# Patient Record
Sex: FEMALE | Race: WHITE
[De-identification: names, ages, dates, MRNs, and addresses within clinical notes are randomized per-mention and may not be internally consistent; named-entity substitution may affect disease eponyms.]

---

## 2017-08-29 ENCOUNTER — HOSPITAL ENCOUNTER (OUTPATIENT)
Dept: HOSPITAL 62 - WI | Age: 77
End: 2017-08-29
Attending: PHYSICIAN ASSISTANT
Payer: MEDICARE

## 2017-08-29 DIAGNOSIS — C50.911: Primary | ICD-10-CM

## 2017-08-29 PROCEDURE — G0204 DX MAMMO INCL CAD BI: HCPCS

## 2017-08-29 PROCEDURE — 77066 DX MAMMO INCL CAD BI: CPT

## 2017-08-30 NOTE — WOMENS IMAGING REPORT
EXAM DESCRIPTION:  BILAT DIAGNOSTIC MAMMO W/CAD



COMPLETED DATE/TIME:  8/29/2017 12:54 pm



REASON FOR STUDY:  BREAST CANCER C50.911  MALIGNANT NEOPLASM OF UNSP SITE OF RIGHT FEMALE SHE



COMPARISON:  Multiple since 1/15/2015



TECHNIQUE:  Standard craniocaudal and mediolateral oblique views of each breast recorded using digita
l acquisition and breast tomosynthesis.

Additional right breast 90 mediolateral tomosynthesis



LIMITATIONS:  None.



FINDINGS:  RIGHT BREAST

MASSES: No suspicious masses.

CALCIFICATIONS: No new or suspicious calcifications.

ARCHITECTURAL DISTORTION: Post therapeutic architectural distortion is present from lumpectomy.  This
 is adjacent to multiple surgical clips.  Overall density of the lumpectomy site is decreased over th
e series of exams

DEVELOPING DENSITY: None.

ASYMMETRY: None noted.

OTHER: No other significant findings.

LEFT BREAST

MASSES: No suspicious masses.

CALCIFICATIONS: No new or suspicious calcifications.

ARCHITECTURAL DISTORTION: None.

DEVELOPING DENSITY: None.

ASYMMETRY: None noted.

OTHER: No other significant finding.

Read with the assistance of CAD:

.[facility] - R2 C[employer]ova Version 1.3

.[facility] - R2 Cenova Version 1.3

.[facility] - R2 Cenova Version 2.4

.[facility] - R2 Cenova Version 2.4

.[facility] - R2  Version 9.2



IMPRESSION:  No mammographic/ tomosynthesis evidence for malignancy bilaterally



BREAST DENSITY:  c. The breasts are heterogeneously dense, which may obscure small masses.



BIRAD:  2 Benign findings.



RECOMMENDATION:  RECOMMENDED FOLLOW UP: No mammographic/ tomosynthesis evidence of malignancy bilater
ally.

SPECIFIC INTERVENTION/IMAGING/CONSULTATION RECOMMENDED:No additional intervention/ imaging/consultati
on needed at this time.

COMMUNICATION:Patient notified by letter



COMMENT:  The patient has been notified of the results by letter per SA requirements. Additional no
tification policies are in place for contacting patient with suspicious or incomplete findings.

Quality ID #225: The American College of Radiology recommends an annual screening mammogram for women
 aged 40 years or over. This facility utilizes a reminder system to ensure that all patients receive 
reminder letters, and/or direct phone calls for appointments. This includes reminders for routine scr
eening mammograms, diagnostic mammograms, or other Breast Imaging Interventions when appropriate.  Th
is patient will be placed in the appropriate reminder system.

The American College of Radiology (ACR) has developed recommendations for screening MRI of the breast
s in certain patient populations, to be used in conjunction with mammography.  Breast MRI surveillanc
e may be appropriate for women with more than 20% lifetime risk of developing breast cancer  as deter
mined by genetic testing, significant family history of the disease, or history of mantle radiation f
or Hodgkins Disease.  ACR Practice Guidelines 2008.

[employer]



RS 6045F:  Fluoroscopic imaging is not utilized for breast tomosynthesis.



TECHNICAL DOCUMENTATION:  FINDING NUMBER: (1)

ASSESSMENT: (1)

JOB ID:  [id number]

 2011 [employer]- All Rights Reserved

## 2018-02-21 ENCOUNTER — HOSPITAL ENCOUNTER (OUTPATIENT)
Dept: HOSPITAL 62 - END | Age: 78
Discharge: HOME | End: 2018-02-21
Attending: INTERNAL MEDICINE
Payer: MEDICARE

## 2018-02-21 VITALS — DIASTOLIC BLOOD PRESSURE: 64 MMHG | SYSTOLIC BLOOD PRESSURE: 125 MMHG

## 2018-02-21 DIAGNOSIS — I10: ICD-10-CM

## 2018-02-21 DIAGNOSIS — K31.9: ICD-10-CM

## 2018-02-21 DIAGNOSIS — Z79.899: ICD-10-CM

## 2018-02-21 DIAGNOSIS — K59.09: ICD-10-CM

## 2018-02-21 DIAGNOSIS — K22.719: Primary | ICD-10-CM

## 2018-02-21 DIAGNOSIS — E78.00: ICD-10-CM

## 2018-02-21 PROCEDURE — G0121 COLON CA SCRN NOT HI RSK IND: HCPCS

## 2018-02-21 PROCEDURE — 0DB68ZX EXCISION OF STOMACH, VIA NATURAL OR ARTIFICIAL OPENING ENDOSCOPIC, DIAGNOSTIC: ICD-10-PCS | Performed by: INTERNAL MEDICINE

## 2018-02-21 PROCEDURE — 43239 EGD BIOPSY SINGLE/MULTIPLE: CPT

## 2018-02-21 PROCEDURE — 0D558ZZ DESTRUCTION OF ESOPHAGUS, VIA NATURAL OR ARTIFICIAL OPENING ENDOSCOPIC: ICD-10-PCS | Performed by: INTERNAL MEDICINE

## 2018-02-21 PROCEDURE — 43270 EGD LESION ABLATION: CPT

## 2018-02-21 PROCEDURE — 0DJD8ZZ INSPECTION OF LOWER INTESTINAL TRACT, VIA NATURAL OR ARTIFICIAL OPENING ENDOSCOPIC: ICD-10-PCS | Performed by: INTERNAL MEDICINE

## 2018-02-21 PROCEDURE — 88305 TISSUE EXAM BY PATHOLOGIST: CPT

## 2018-02-21 PROCEDURE — 88342 IMHCHEM/IMCYTCHM 1ST ANTB: CPT

## 2018-02-21 NOTE — OPERATIVE REPORT
Operative Report


DATE OF SURGERY: 02/21/18


Operative Report: 





The risks, benefits and alternatives of the procedure, perforation requiring 

surgery are explained to the patient in detail and informed consent is 

obtained.  Patient is placed in the left, lateral decubital position.  Timeout 

was called.  Propofol medications administered.  A rectal examination is done 

which did not reveal any masses, tears or fissures.  An Olympus videoscope was 

inserted into the patient's rectum.  The scope was then carefully advanced all 

the way to the cecum.  The cecum was identified by the usual anatomical 

landmarks including the ileocecal valve as well as the appendiceal office.  

Photodocumentation was obtained.  The scope was then sequentially pulled back 

via the various segments of the colon including the ascending colon, hepatic 

flexure, transverse colon, splenic flexure, descending colon finding to the 

rectosigmoid portions of the colon.  Retroflexion maneuvers performed.


The risks benefits and alternatives of the procedure explained to the patient 

in detail and informed consent is obtained.A GIF Olympus video scope was 

inserted into the patient's mouth and hypopharynx, the esophagus is identified 

intubated and insufflated, the scope was then advanced through the esophagus 

stomach and duodenum, retroflexion maneuver is done, the esophagus stomach and 

first and second portions of the duodenum examined


PREOPERATIVE DIAGNOSIS: Chronic constipation.  Change in bowel habits.  Known 

history of Kelley's esophagus


POSTOPERATIVE DIAGNOSIS: Kelley's esophagus is noted, status post ablation.  

Well-prepped colon.  Normal screening colonoscopy.


OPERATION: EGD with ablation.  EGD with biopsy.  Diagnostic colonoscopy


SURGEON: VIKTORIA MADRID


ANESTHESIA: LMAC


TISSUE REMOVED OR ALTERED: As noted above.


COMPLICATIONS: 





None.


ESTIMATED BLOOD LOSS: None.


INTRAOPERATIVE FINDINGS: As noted above.


PROCEDURE: 





Patient tolerated procedure well.


No immediate postprocedure complications are noted.


Patient discharged in good condition.


Discharge date 2/21/2018


Discharge diet: Regular.


Discharge activity: Regular.


2-3 week follow-up to discuss findings.


Patient is instructed to call the office or proceed to the emergency room 

should there be any further problems or questions.


We will wait and pathology.

## 2018-07-19 ENCOUNTER — HOSPITAL ENCOUNTER (OUTPATIENT)
Dept: HOSPITAL 62 - LAB | Age: 78
End: 2018-07-19
Attending: PLASTIC SURGERY
Payer: MEDICARE

## 2018-07-19 DIAGNOSIS — K76.0: Primary | ICD-10-CM

## 2018-07-19 DIAGNOSIS — R94.5: ICD-10-CM

## 2018-07-19 PROCEDURE — 80074 ACUTE HEPATITIS PANEL: CPT

## 2018-07-19 PROCEDURE — 36415 COLL VENOUS BLD VENIPUNCTURE: CPT

## 2018-07-20 LAB — HEPATITIS C VIRUS ANTIBODY: <0.1 S/CO RATIO (ref 0–0.9)

## 2018-09-21 ENCOUNTER — HOSPITAL ENCOUNTER (OUTPATIENT)
Dept: HOSPITAL 62 - OROUT | Age: 78
Discharge: HOME | End: 2018-09-21
Attending: INTERNAL MEDICINE
Payer: MEDICARE

## 2018-09-21 VITALS — SYSTOLIC BLOOD PRESSURE: 131 MMHG | DIASTOLIC BLOOD PRESSURE: 68 MMHG

## 2018-09-21 DIAGNOSIS — K44.9: ICD-10-CM

## 2018-09-21 DIAGNOSIS — K29.50: ICD-10-CM

## 2018-09-21 DIAGNOSIS — I49.9: ICD-10-CM

## 2018-09-21 DIAGNOSIS — K22.719: Primary | ICD-10-CM

## 2018-09-21 DIAGNOSIS — Z79.899: ICD-10-CM

## 2018-09-21 DIAGNOSIS — B19.20: ICD-10-CM

## 2018-09-21 DIAGNOSIS — E78.00: ICD-10-CM

## 2018-09-21 DIAGNOSIS — Z85.3: ICD-10-CM

## 2018-09-21 DIAGNOSIS — G47.33: ICD-10-CM

## 2018-09-21 DIAGNOSIS — I10: ICD-10-CM

## 2018-09-21 DIAGNOSIS — Z86.73: ICD-10-CM

## 2018-09-21 PROCEDURE — 93005 ELECTROCARDIOGRAM TRACING: CPT

## 2018-09-21 PROCEDURE — 88342 IMHCHEM/IMCYTCHM 1ST ANTB: CPT

## 2018-09-21 PROCEDURE — 93010 ELECTROCARDIOGRAM REPORT: CPT

## 2018-09-21 PROCEDURE — 43270 EGD LESION ABLATION: CPT

## 2018-09-21 PROCEDURE — 43239 EGD BIOPSY SINGLE/MULTIPLE: CPT

## 2018-09-21 PROCEDURE — 88305 TISSUE EXAM BY PATHOLOGIST: CPT

## 2018-09-21 NOTE — OPERATIVE REPORT
Operative Report


DATE OF SURGERY: 09/21/18


Operative Report: 





The risks benefits and alternatives of the procedure explained to the patient 

in detail and informed consent is obtained.A GIF Olympus video scope was 

inserted into the patient's mouth and hypopharynx, the esophagus is identified 

intubated and insufflated, the scope was then advanced through the esophagus 

stomach and duodenum, retroflexion maneuver is done the esophagus stomach and 

first and second portions of the duodenum examined


PREOPERATIVE DIAGNOSIS: Follow-up Kelley's esophagus


POSTOPERATIVE DIAGNOSIS: Kelley's esophagus status post radiofrequency 

ablation.  Hiatal hernia.  Gastritis status post biopsy rule out Helicobacter 

pylori


OPERATION: EGD with radiofrequency ablation.  EGD with biopsy


SURGEON: VIKTORIA MADRID


ANESTHESIA: LMAC


TISSUE REMOVED OR ALTERED: As noted above.


COMPLICATIONS: 





None.


ESTIMATED BLOOD LOSS: None.


INTRAOPERATIVE FINDINGS: As noted above.


PROCEDURE: 





Patient tolerated the procedure well.


No immediate postprocedure complications are noted.


Patient discharged in good condition.


Discharge date 9/21/2018.


Discharge diet: Regular.


Discharge activity: Regular.


2-3 week follow-up to discuss findings.


Patient is instructed call the office or proceed to the emergency room should 

there be any further problems or questions.


Wait on the pathology.

## 2019-06-13 ENCOUNTER — HOSPITAL ENCOUNTER (OUTPATIENT)
Dept: HOSPITAL 62 - OD | Age: 79
End: 2019-06-13
Attending: PLASTIC SURGERY
Payer: MEDICARE

## 2019-06-13 DIAGNOSIS — D23.61: Primary | ICD-10-CM

## 2019-06-13 PROCEDURE — 88305 TISSUE EXAM BY PATHOLOGIST: CPT

## 2019-08-09 ENCOUNTER — HOSPITAL ENCOUNTER (OUTPATIENT)
Dept: HOSPITAL 62 - RAD | Age: 79
End: 2019-08-09
Attending: PHYSICIAN ASSISTANT
Payer: MEDICARE

## 2019-08-09 DIAGNOSIS — R94.5: Primary | ICD-10-CM

## 2019-08-09 PROCEDURE — 76700 US EXAM ABDOM COMPLETE: CPT

## 2019-08-09 NOTE — RADIOLOGY REPORT (SQ)
EXAM DESCRIPTION:  U/S ABDOMEN COMPLETE W/O DOP



COMPLETED DATE/TIME:  8/9/2019 12:02 pm



REASON FOR STUDY:  R94.5 ABNORMAL RESULTS OF LIVER FUNCTION STUDIES R94.5  ABNORMAL RESULTS OF LIVER 
FUNCTION STUDIES



COMPARISON:  None.



TECHNIQUE:  Dynamic and static grayscale images acquired of the abdomen and recorded on PACS. Additio
nal selected color Doppler and spectral images recorded.

Note:  Study does not meet criteria for complete doppler/duplex scan



LIMITATIONS:  None.



FINDINGS:  PANCREAS: No masses. Visualized pancreatic duct normal caliber.

LIVER: No focal lesions.  Increased echogenicity.  Hepatomegaly.

LIVER VASCULATURE: Normal directional flow of the main portal vein and hepatic veins.

GALLBLADDER: Surgically absent.

ULTRASOUND-DETECTED PEARCE'S SIGN: Not applicable.

INTRAHEPATIC DUCTS AND COMMON DUCT: CBD and intrahepatic ducts normal caliber. No filling defects.

INFERIOR VENA CAVA: Normal flow.

AORTA: Aortic atherosclerosis.  Proximal aorta measures up to 3.3 cm.

RIGHT KIDNEY:  Normal in size measuring 10.8 cm.   Normal echogenicity.   No solid or suspicious mass
es.   No hydronephrosis.   No calcifications.

LEFT KIDNEY:  Normal in size measuring 10.1 cm.   Normal echogenicity.   No solid or suspicious myles
s.   No hydronephrosis.   No calcifications.

SPLEEN: Normal size measuring 10.8 cm.  Scattered calcifications compatible prior granulomatous disea
se.

PERITONEAL AND PLEURAL SPACES: No ascites or effusions.

OTHER: No other significant finding.



IMPRESSION:  1.  Hepatic steatosis.

2.  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  Follow-up recommendations as below.

3.  Prior cholecystectomy.



COMMENT:   AAA Size:            Follow-up Recommendation

3.0-3.4 cm Every 3 years_____________________________________________________________________________
________

*Based upon the Society for Vascular Surgery Guidelines: J Vasc Surg. 2009 Oct;50(4 Suppl):S2-49

*For aortas of maximum diameter of 2.6-2.9 cm meeting the criteria for AAA (?1.5 x proximal normal se
gment)



TECHNICAL DOCUMENTATION:  JOB ID:  [id number]

 2011 [employer]- All Rights Reserved



Reading location - IP/workstation name: [email or web address]

## 2019-09-11 ENCOUNTER — HOSPITAL ENCOUNTER (OUTPATIENT)
Dept: HOSPITAL 62 - WI | Age: 79
End: 2019-09-11
Attending: PHYSICIAN ASSISTANT
Payer: MEDICARE

## 2019-09-11 DIAGNOSIS — N64.89: Primary | ICD-10-CM

## 2019-09-11 DIAGNOSIS — Z08: ICD-10-CM

## 2019-09-11 DIAGNOSIS — Z85.3: ICD-10-CM

## 2019-09-11 PROCEDURE — G0279 TOMOSYNTHESIS, MAMMO: HCPCS

## 2019-09-11 PROCEDURE — 77062 BREAST TOMOSYNTHESIS BI: CPT

## 2019-09-11 PROCEDURE — 77066 DX MAMMO INCL CAD BI: CPT

## 2019-09-12 NOTE — WOMENS IMAGING REPORT
EXAM DESCRIPTION:  3D DX MAMMO BILAT



COMPLETED DATE/TIME:  9/11/2019 1:35 pm



REASON FOR STUDY:    PERSONAL HISTORY OF BREAST CANCER Z12.31  ENCNTR SCREEN MAMMOGRAM FOR MALIG
NANT NEOPLASM OF MI Z85.3  PERSONAL HISTORY OF MALIGNANT NEOPLASM OF BREAST



COMPARISON:  Multiple since 2014



EXAM PARAMETERS:  Standard craniocaudal and mediolateral oblique views of each breast recorded using 
digital acquisition and breast tomosynthesis.

Additional right breast 90 mediolateral mammography and tomosynthesis.

Read with the assistance of CAD:

.[facility] - eyefactive  Version 9.2



LIMITATIONS:  None.



FINDINGS:  RIGHT BREAST

MASSES: No suspicious masses.

CALCIFICATIONS: Coarse dense dystrophic calcifications are present at the lumpectomy site

ARCHITECTURAL DISTORTION: Architectural distortion right breast upper outer quadrant post lumpectomy.


DEVELOPING DENSITY: None.

ASYMMETRY: None noted.

OTHER: Skin thickening right breast post radiation

LEFT BREAST

MASSES: No suspicious masses.

CALCIFICATIONS: No new or suspicious calcifications.

ARCHITECTURAL DISTORTION: None.

DEVELOPING DENSITY: None.

ASYMMETRY: None noted.

OTHER: No other significant finding.



IMPRESSION:  No mammographic evidence for malignancy bilaterally.  Post therapeutic changes right mi
ast.



BREAST DENSITY:  b. There are scattered areas of fibroglandular density.



BIRAD:  ASSESSMENT:  2 Benign findings.



RECOMMENDATION:  RECOMMENDED FOLLOW UP: Please continue right breast diagnostic, left breast screenin
g mammography/tomosynthesis in September 2020

SPECIFIC INTERVENTION/IMAGING/CONSULTATION RECOMMENDED:No additional intervention/ imaging/consultati
on needed at this time.

COMMUNICATION:The negative/benign results were communicated to the patient.



COMMENT:  The patient has been notified of the results by letter per MQSA requirements. Additional no
tification policies are in place for contacting patient with suspicious or incomplete findings.

Quality ID #225: The American College of Radiology recommends an annual screening mammogram for women
 aged 40 years or over. This facility utilizes a reminder system to ensure that all patients receive 
reminder letters, and/or direct phone calls for appointments. This includes reminders for routine scr
eening mammograms, diagnostic mammograms, or other Breast Imaging Interventions when appropriate.  Th
is patient will be placed in the appropriate reminder system.



TECHNICAL DOCUMENTATION:  FINDING NUMBER: (1)

ASSESSMENT: (1)

JOB ID:  [id number]

 2011 [employer]- All Rights Reserved



Reading location - IP/workstation name: [email or web address]

## 2019-11-07 ENCOUNTER — HOSPITAL ENCOUNTER (OUTPATIENT)
Dept: HOSPITAL 62 - WC | Age: 79
End: 2019-11-07
Attending: PODIATRIST
Payer: MEDICARE

## 2019-11-07 DIAGNOSIS — L97.512: Primary | ICD-10-CM

## 2019-11-07 LAB
ADD MANUAL DIFF: NO
ALBUMIN SERPL-MCNC: 4.5 G/DL (ref 3.5–5)
ALP SERPL-CCNC: 91 U/L (ref 38–126)
ANION GAP SERPL CALC-SCNC: 10 MMOL/L (ref 5–19)
AST SERPL-CCNC: 120 U/L (ref 14–36)
BASOPHILS # BLD AUTO: 0 10^3/UL (ref 0–0.2)
BASOPHILS NFR BLD AUTO: 0.7 % (ref 0–2)
BILIRUB DIRECT SERPL-MCNC: 0.4 MG/DL (ref 0–0.4)
BILIRUB SERPL-MCNC: 0.7 MG/DL (ref 0.2–1.3)
BUN SERPL-MCNC: 17 MG/DL (ref 7–20)
CALCIUM: 10 MG/DL (ref 8.4–10.2)
CHLORIDE SERPL-SCNC: 107 MMOL/L (ref 98–107)
CO2 SERPL-SCNC: 25 MMOL/L (ref 22–30)
CRP SERPL-MCNC: < 5 MG/L (ref ?–10)
EOSINOPHIL # BLD AUTO: 0.1 10^3/UL (ref 0–0.6)
EOSINOPHIL NFR BLD AUTO: 1.8 % (ref 0–6)
ERYTHROCYTE [DISTWIDTH] IN BLOOD BY AUTOMATED COUNT: 13.3 % (ref 11.5–14)
ERYTHROCYTE [SEDIMENTATION RATE] IN BLOOD: 22 MM/HR (ref 0–30)
GLUCOSE SERPL-MCNC: 133 MG/DL (ref 75–110)
HCT VFR BLD CALC: 40.3 % (ref 36–47)
HGB BLD-MCNC: 13.5 G/DL (ref 12–15.5)
LYMPHOCYTES # BLD AUTO: 1.1 10^3/UL (ref 0.5–4.7)
LYMPHOCYTES NFR BLD AUTO: 20.7 % (ref 13–45)
MCH RBC QN AUTO: 30.8 PG (ref 27–33.4)
MCHC RBC AUTO-ENTMCNC: 33.5 G/DL (ref 32–36)
MCV RBC AUTO: 92 FL (ref 80–97)
MONOCYTES # BLD AUTO: 0.5 10^3/UL (ref 0.1–1.4)
MONOCYTES NFR BLD AUTO: 8.9 % (ref 3–13)
NEUTROPHILS # BLD AUTO: 3.7 10^3/UL (ref 1.7–8.2)
NEUTS SEG NFR BLD AUTO: 67.9 % (ref 42–78)
PLATELET # BLD: 176 10^3/UL (ref 150–450)
POTASSIUM SERPL-SCNC: 4 MMOL/L (ref 3.6–5)
PROT SERPL-MCNC: 7.7 G/DL (ref 6.3–8.2)
RBC # BLD AUTO: 4.39 10^6/UL (ref 3.72–5.28)
TOTAL CELLS COUNTED % (AUTO): 100 %
WBC # BLD AUTO: 5.5 10^3/UL (ref 4–10.5)

## 2019-11-07 PROCEDURE — 86140 C-REACTIVE PROTEIN: CPT

## 2019-11-07 PROCEDURE — 85652 RBC SED RATE AUTOMATED: CPT

## 2019-11-07 PROCEDURE — 80053 COMPREHEN METABOLIC PANEL: CPT

## 2019-11-07 PROCEDURE — 36415 COLL VENOUS BLD VENIPUNCTURE: CPT

## 2019-11-07 PROCEDURE — 85025 COMPLETE CBC W/AUTO DIFF WBC: CPT

## 2019-11-07 NOTE — RADIOLOGY REPORT (SQ)
EXAM DESCRIPTION:  FOOT RIGHT COMPLETE



COMPLETED DATE/TIME:  11/7/2019 3:58 pm



REASON FOR STUDY:  NON-PRS CHRONIC ULCER OTH PRT RIGHT FOOT W FAT LAYER EXPOSED L97.512  NON-PRS 
ELENA ULCER OTH PRT RIGHT FOOT W FAT LAYER



COMPARISON:  None.



NUMBER OF VIEWS:  Three views.



TECHNIQUE:  AP, lateral and oblique  radiographic images acquired of the right foot.



LIMITATIONS:  None.



FINDINGS:  MINERALIZATION: Normal.

BONES: No acute fracture or dislocation.  No worrisome bone lesions.

JOINTS: Intact.

SOFT TISSUES: Dystrophic calcification in the soft tissues plantar to the 2nd metatarsophalangeal amina
nt.  No metal foreign body.

OTHER: No other significant finding.



IMPRESSION:  No evidence of osteomyelitis.



TECHNICAL DOCUMENTATION:  JOB ID:  [id number]

 2011 [employer]- All Rights Reserved



Reading location - IP/workstation name: [email or web address]

## 2019-11-14 ENCOUNTER — HOSPITAL ENCOUNTER (OUTPATIENT)
Dept: HOSPITAL 62 - SP | Age: 79
End: 2019-11-14
Attending: PODIATRIST
Payer: MEDICARE

## 2019-11-14 DIAGNOSIS — L97.512: Primary | ICD-10-CM

## 2019-11-14 PROCEDURE — 93925 LOWER EXTREMITY STUDY: CPT

## 2019-11-16 NOTE — XCELERA REPORT
[address] [address]

                               Tel: [phone]

                               Fax: [phone]



                      Lower Extremity Arterial Evaluation

_______________________________________________________________________________



Name: GRACE SAHU

MRN: [id number]                                       Age: 78 yrs

Gender: Female                                        : 1940

Patient Status: Outpatient                            Patient Location: [facility]

Account #: [id number]

Study Date: 2019 11:25 AM

                        Accession #: [id number]

_______________________________________________________________________________

Procedure: A color flow and duplex scan of the lower extremity arteries was

performed bilaterally with velocity and waveform anaylsis. Ankle brachial

indicies performed.

Reason For Study: RLE ULCER







Ordering Physician: RICKY YEH

Performed By: Dominique Stratton

_______________________________________________________________________________

_______________________________________________________________________________





Measurements and Calculations



                                     Right  Left

                     CFA PSV         149.3 125.7 cm/sec

                     Prox PFA PSV    -68.8  49.8 cm/sec

                     Prox SFA PSV    121.8  94.8 cm/sec

                     Mid SFA PSV     -94.3 -79.4 cm/sec

                     Dist SFA PSV    -81.0 -67.2 cm/sec

                     Prox Pop A PSV  57.5   71.2 cm/sec

                     Dist SCARLETT PSV    77.1   71.6 cm/sec

                     Dist PTA PSV    91.8   67.7 cm/sec

                     Kar Pedis PSV   86.9   78.3 cm/sec



_______________________________________________________________________________

Right Side Arterial Evaluation

Normal velocity and triphasic waveforms noted from the Common Femoral artery

to the infrageniculate vessels .



Ankle Brachial index 1.33. !.48 in the PT,, suggesting non compressibility.



Left Side Arterial Evaluation

Normal velocity and triphasic waveforms noted from the Common Femoral artery

to the infrageniculate vessels .



Biphasic with normal velocity in the Deep Femoral artery.



Ankle Brachial index 1.33.



_______________________________________________________________________________

Interpretation Summary

No hemodynamically significant lesions in the bilateral lower extremities, on

duplex imaging, at rest. FAYE's are normal, except for the right Posterior

Tibial, which is borderline for non compressibility.

_______________________________________________________________________________

Electronically signed by:      Lennox Williams      on 2019 02:36 PM



CC: RICKY YEH

>

Williams, Lennox

## 2019-12-11 ENCOUNTER — HOSPITAL ENCOUNTER (OUTPATIENT)
Dept: HOSPITAL 62 - WC | Age: 79
End: 2019-12-11
Attending: NURSE PRACTITIONER
Payer: MEDICARE

## 2019-12-11 DIAGNOSIS — G60.3: ICD-10-CM

## 2019-12-11 DIAGNOSIS — L97.512: Primary | ICD-10-CM

## 2019-12-11 LAB
ADD MANUAL DIFF: NO
ALBUMIN SERPL-MCNC: 4.5 G/DL (ref 3.5–5)
ALP SERPL-CCNC: 90 U/L (ref 38–126)
ANION GAP SERPL CALC-SCNC: 12 MMOL/L (ref 5–19)
AST SERPL-CCNC: 72 U/L (ref 14–36)
BASOPHILS # BLD AUTO: 0 10^3/UL (ref 0–0.2)
BASOPHILS NFR BLD AUTO: 1.1 % (ref 0–2)
BILIRUB DIRECT SERPL-MCNC: 0.4 MG/DL (ref 0–0.4)
BILIRUB SERPL-MCNC: 0.8 MG/DL (ref 0.2–1.3)
BUN SERPL-MCNC: 12 MG/DL (ref 7–20)
CALCIUM: 10 MG/DL (ref 8.4–10.2)
CHLORIDE SERPL-SCNC: 108 MMOL/L (ref 98–107)
CO2 SERPL-SCNC: 25 MMOL/L (ref 22–30)
CRP SERPL-MCNC: < 5 MG/L (ref ?–10)
EOSINOPHIL # BLD AUTO: 0.1 10^3/UL (ref 0–0.6)
EOSINOPHIL NFR BLD AUTO: 2.3 % (ref 0–6)
ERYTHROCYTE [DISTWIDTH] IN BLOOD BY AUTOMATED COUNT: 13.1 % (ref 11.5–14)
ERYTHROCYTE [SEDIMENTATION RATE] IN BLOOD: 19 MM/HR (ref 0–30)
GLUCOSE SERPL-MCNC: 94 MG/DL (ref 75–110)
HCT VFR BLD CALC: 40.3 % (ref 36–47)
HGB BLD-MCNC: 13.6 G/DL (ref 12–15.5)
LYMPHOCYTES # BLD AUTO: 1 10^3/UL (ref 0.5–4.7)
LYMPHOCYTES NFR BLD AUTO: 23.9 % (ref 13–45)
MCH RBC QN AUTO: 30.5 PG (ref 27–33.4)
MCHC RBC AUTO-ENTMCNC: 33.7 G/DL (ref 32–36)
MCV RBC AUTO: 91 FL (ref 80–97)
MONOCYTES # BLD AUTO: 0.4 10^3/UL (ref 0.1–1.4)
MONOCYTES NFR BLD AUTO: 10.8 % (ref 3–13)
NEUTROPHILS # BLD AUTO: 2.5 10^3/UL (ref 1.7–8.2)
NEUTS SEG NFR BLD AUTO: 61.9 % (ref 42–78)
PLATELET # BLD: 148 10^3/UL (ref 150–450)
POTASSIUM SERPL-SCNC: 4 MMOL/L (ref 3.6–5)
PROT SERPL-MCNC: 7.8 G/DL (ref 6.3–8.2)
RBC # BLD AUTO: 4.45 10^6/UL (ref 3.72–5.28)
TOTAL CELLS COUNTED % (AUTO): 100 %
WBC # BLD AUTO: 4 10^3/UL (ref 4–10.5)

## 2019-12-11 PROCEDURE — 85025 COMPLETE CBC W/AUTO DIFF WBC: CPT

## 2019-12-11 PROCEDURE — 80053 COMPREHEN METABOLIC PANEL: CPT

## 2019-12-11 PROCEDURE — 36415 COLL VENOUS BLD VENIPUNCTURE: CPT

## 2019-12-11 PROCEDURE — 86140 C-REACTIVE PROTEIN: CPT

## 2019-12-11 PROCEDURE — 85652 RBC SED RATE AUTOMATED: CPT

## 2019-12-11 NOTE — RADIOLOGY REPORT (SQ)
EXAM DESCRIPTION:  FOOT RIGHT COMPLETE



COMPLETED DATE/TIME:  12/11/2019 11:01 am



REASON FOR STUDY:  NON-PRS CHRONIC ULCER OTH PRT RIGHT FOOT W FAT LAYER EXPOSED L97.512  NON-PRS 
ELENA ULCER OTH PRT RIGHT FOOT W FAT LAYER G60.3  IDIOPATHIC PROGRESSIVE NEUROPATHY



COMPARISON:  11/7/2019



NUMBER OF VIEWS:  Three views.



TECHNIQUE:  AP, lateral and oblique  radiographic images acquired of the right foot.



LIMITATIONS:  None.



FINDINGS:  MINERALIZATION: Normal.

BONES: No acute fracture or dislocation.  No worrisome bone lesions.  No aggressive bony demineraliza
tion at the 1st metatarsal head or base great toe proximal phalanx.

JOINTS: No effusions.

SOFT TISSUES: There is a soft tissue ulcer over the medial aspect of the 1st metatarsophalangeal join
t.  No soft tissue gas.  No foreign body.

OTHER: No other significant finding.



IMPRESSION:  Soft tissue ulcer over the medial right 1st metatarsophalangeal joint.  No aggressive skyler
ny demineralization worrisome for osteomyelitis



TECHNICAL DOCUMENTATION:  JOB ID:  [id number]

 2011 [employer]- All Rights Reserved



Reading location - IP/workstation name: [email or web address]

## 2020-01-24 ENCOUNTER — HOSPITAL ENCOUNTER (OUTPATIENT)
Dept: HOSPITAL 62 - RAD | Age: 80
End: 2020-01-24
Attending: PHYSICIAN ASSISTANT
Payer: MEDICARE

## 2020-01-24 DIAGNOSIS — R22.9: Primary | ICD-10-CM

## 2020-01-24 PROCEDURE — 76882 US LMTD JT/FCL EVL NVASC XTR: CPT

## 2020-01-24 NOTE — RADIOLOGY REPORT (SQ)
EXAM DESCRIPTION:  U/S EXTREMITY NONVASCULAR LTD



COMPLETED DATE/TIME:  1/24/2020 4:58 pm



REASON FOR STUDY:  R22.9 LOCALIZED SWELLING, MASS AND LUMP, UNSPECIFIED R22.9  LOCALIZED SWELLING, MA
SS AND LUMP, UNSPECIFIED



COMPARISON:  None.



TECHNIQUE:  Static and real time gray scale ultrasound Doppler spectral analysis, and color Doppler a
cquired of the soft tissues right posterior upper back



LIMITATIONS:  None.



FINDINGS:  Patient has a tender nodule in the right upper posterior back for 1 week.

Focused ultrasound over the palpable abnormality demonstrates a 1 x 0.5 cm well-circumscribed hyperec
hoic nodule immediately deep to the dermis, likely a sebaceous cyst is superimposed inflammation.  No
 surrounding increased color flow.



IMPRESSION:  Probable 1 x 0.5 cm infected or inflamed sebaceous cyst correlating with the palpable ab
normality over the right posterior upper back



TECHNICAL DOCUMENTATION:  JOB ID:  [id number]

 2011 [employer]- All Rights Reserved



Reading location - IP/workstation name: [email or web address]

## 2020-07-08 ENCOUNTER — HOSPITAL ENCOUNTER (EMERGENCY)
Dept: HOSPITAL 62 - ER | Age: 80
Discharge: HOME | End: 2020-07-08
Payer: MEDICARE

## 2020-07-08 VITALS — SYSTOLIC BLOOD PRESSURE: 136 MMHG | DIASTOLIC BLOOD PRESSURE: 71 MMHG

## 2020-07-08 DIAGNOSIS — Z20.828: ICD-10-CM

## 2020-07-08 DIAGNOSIS — R19.7: ICD-10-CM

## 2020-07-08 DIAGNOSIS — K64.9: Primary | ICD-10-CM

## 2020-07-08 DIAGNOSIS — Z79.82: ICD-10-CM

## 2020-07-08 DIAGNOSIS — Z88.8: ICD-10-CM

## 2020-07-08 DIAGNOSIS — R79.89: ICD-10-CM

## 2020-07-08 DIAGNOSIS — I10: ICD-10-CM

## 2020-07-08 DIAGNOSIS — Z79.899: ICD-10-CM

## 2020-07-08 LAB
ADD MANUAL DIFF: NO
ALBUMIN SERPL-MCNC: 4.5 G/DL (ref 3.5–5)
ALP SERPL-CCNC: 115 U/L (ref 38–126)
ANION GAP SERPL CALC-SCNC: 7 MMOL/L (ref 5–19)
AST SERPL-CCNC: 147 U/L (ref 14–36)
BASOPHILS # BLD AUTO: 0 10^3/UL (ref 0–0.2)
BASOPHILS NFR BLD AUTO: 0.8 % (ref 0–2)
BILIRUB DIRECT SERPL-MCNC: 0.2 MG/DL (ref 0–0.4)
BILIRUB SERPL-MCNC: 0.8 MG/DL (ref 0.2–1.3)
BUN SERPL-MCNC: 14 MG/DL (ref 7–20)
CALCIUM: 9.7 MG/DL (ref 8.4–10.2)
CHLORIDE SERPL-SCNC: 110 MMOL/L (ref 98–107)
CO2 SERPL-SCNC: 25 MMOL/L (ref 22–30)
EOSINOPHIL # BLD AUTO: 0.1 10^3/UL (ref 0–0.6)
EOSINOPHIL NFR BLD AUTO: 2.6 % (ref 0–6)
ERYTHROCYTE [DISTWIDTH] IN BLOOD BY AUTOMATED COUNT: 13.1 % (ref 11.5–14)
GLUCOSE SERPL-MCNC: 94 MG/DL (ref 75–110)
HCT VFR BLD CALC: 38.9 % (ref 36–47)
HGB BLD-MCNC: 13.3 G/DL (ref 12–15.5)
LYMPHOCYTES # BLD AUTO: 0.9 10^3/UL (ref 0.5–4.7)
LYMPHOCYTES NFR BLD AUTO: 20.6 % (ref 13–45)
MCH RBC QN AUTO: 31.5 PG (ref 27–33.4)
MCHC RBC AUTO-ENTMCNC: 34.1 G/DL (ref 32–36)
MCV RBC AUTO: 92 FL (ref 80–97)
MONOCYTES # BLD AUTO: 0.5 10^3/UL (ref 0.1–1.4)
MONOCYTES NFR BLD AUTO: 10.4 % (ref 3–13)
NEUTROPHILS # BLD AUTO: 3 10^3/UL (ref 1.7–8.2)
NEUTS SEG NFR BLD AUTO: 65.6 % (ref 42–78)
PLATELET # BLD: 144 10^3/UL (ref 150–450)
POTASSIUM SERPL-SCNC: 4.1 MMOL/L (ref 3.6–5)
PROT SERPL-MCNC: 7.8 G/DL (ref 6.3–8.2)
RBC # BLD AUTO: 4.21 10^6/UL (ref 3.72–5.28)
TOTAL CELLS COUNTED % (AUTO): 100 %
WBC # BLD AUTO: 4.6 10^3/UL (ref 4–10.5)

## 2020-07-08 PROCEDURE — 87635 SARS-COV-2 COVID-19 AMP PRB: CPT

## 2020-07-08 PROCEDURE — 96360 HYDRATION IV INFUSION INIT: CPT

## 2020-07-08 PROCEDURE — 36415 COLL VENOUS BLD VENIPUNCTURE: CPT

## 2020-07-08 PROCEDURE — 85025 COMPLETE CBC W/AUTO DIFF WBC: CPT

## 2020-07-08 PROCEDURE — 99283 EMERGENCY DEPT VISIT LOW MDM: CPT

## 2020-07-08 PROCEDURE — 80053 COMPREHEN METABOLIC PANEL: CPT

## 2020-07-08 PROCEDURE — C9803 HOPD COVID-19 SPEC COLLECT: HCPCS

## 2020-07-08 NOTE — ER DOCUMENT REPORT
ED Medical Screen (RME)





- General


Chief Complaint: Hemorrhoids


Stated Complaint: HEMORRHOIDS


Time Seen by Provider: 07/08/20 13:01


Primary Care Provider: 


ISAURO VILLAFUERTE MD [Primary Care Provider] - Follow up as needed


Notes: 





71-year-old female presents to the emergency room in a wheelchair with a history

of hemorrhoids for over 20 years with acute onset severe pain to her rectum over

the last couple of days.  States she has tried sits baths with Epsom salt with 

no relief.  Patient states her pain is 10 out of 10.  Last colonoscopy was 2 

years ago with Dr. Hill.  Reports she did have a rectocele and vaginocele done 

in [place] in 2000.  Daughter is concerned that patient has an incarcerated 

hemorrhoid.  Has any fevers or chills, denies any nausea vomiting or diarrhea.  

Denies any chest pain, shortness of breath.  Reports pain is 5 out of 5 in 

rectum 








Unable to assess patient's rectum due to limited access in triage room and not 

having a bed.  Charge nurse made aware that patient does need a bed for full 

physical examination





I have greeted and performed a rapid initial assessment of this patient.  A 

comprehensive ED assessment and evaluation of the patient, analysis of test 

results and completion of the medical decision making process will be conducted 

by additional ED providers.





PHYSICAL EXAMINATION:





GENERAL: Well-appearing, well-nourished and in moderate distress. 





CV: s1, s2 regular 





LUNGS: No respiratory distress








TRAVEL OUTSIDE OF THE [place] IN LAST 30 DAYS: No





- Related Data


Allergies/Adverse Reactions: 


                                        





ondansetron HCl [From Zofran] Allergy (Intermediate, Verified 07/08/20 13:00)


   Generalized Itching, Hives








Home Medications: venlafaxine er.  meloxicam.  atorvastatin.  pantoprazole.  me

toprolol.  amlodipine.  asa.  levothyroxine





Past Medical History





- Social History


Chew tobacco use (# tins/day): Yes - snuff


Frequency of alcohol use: None


Drug Abuse: None





- Past Medical History


Cardiac Medical History: Reports: Hx Hypercholesterolemia, Hx Hypertension - on 

meds


   Denies: Hx Coronary Artery Disease, Hx Heart Attack


Pulmonary Medical History: 


   Denies: Hx Asthma, Hx Bronchitis, Hx COPD, Hx Pneumonia, Hx Tuberculosis


Neurological Medical History: Denies: Hx Cerebrovascular Accident, Hx Seizures


GI Medical History: Reports: Hx Cirrhosis - No no colic.  Possibly related to 

steroid use for arthritis many years ago, Hx Gastroesophageal Reflux Disease


Musculoskeltal Medical History: Reports Hx Arthritis


Past Surgical History: Reports: Hx Abdominal Surgery - Halo surgery, Hx 

Appendectomy, Hx Breast Surgery, Hx Cholecystectomy, Hx Genitourinary Surgery - 

bladdder/vaginal, Hx Hysterectomy, Hx Tonsillectomy





- Immunizations


Hx Diphtheria, Pertussis, Tetanus Vaccination: Yes





Physical Exam





- Vital signs


Vitals: 





                                        











Temp Pulse Resp BP Pulse Ox


 


 98.4 F   59 L  18   122/72   97 


 


 07/08/20 12:50  07/08/20 12:50  07/08/20 12:50  07/08/20 12:50  07/08/20 12:50














Course





- Vital Signs


Vital signs: 





                                        











Temp Pulse Resp BP Pulse Ox


 


 98.4 F   59 L  18   122/72   97 


 


 07/08/20 13:00  07/08/20 12:50  07/08/20 12:50  07/08/20 12:50  07/08/20 12:50














Doctor's Discharge





- Discharge


Referrals: 


ISAURO VILLAFUERTE MD [Primary Care Provider] - Follow up as needed

## 2020-07-08 NOTE — ER DOCUMENT REPORT
ED General





- General


Chief Complaint: Hemorrhoids


Stated Complaint: HEMORRHOIDS


Time Seen by Provider: 07/08/20 13:01


Primary Care Provider: 


ISAURO VILLAFUERTE MD [ACTIVE STAFF] - Follow up as needed


TRAVEL OUTSIDE OF THE [place] IN LAST 30 DAYS: No





- HPI


Notes: 





Patient is a very pleasant 79-year-old female who presents to the emergency 

department for evaluation of hemorrhoidal pain.  She is been having diarrhea 

daily.  This is been ongoing for the last 3 weeks.  Normally she has con

stipation.  She states that every time she sits down to urinate she has a small 

bowel movement.  It is all watery.  She denies any melena or hematochezia.  No 

unintentional weight loss.  She states she has had some pain on her bilateral 

flanks that she attributes to cramping.  She really has not tried any 

over-the-counter medications to help with her diarrhea.  She states she is 

primarily here because she is having significant pain with her hemorrhoids.  She

is tried sits baths without any significant relief.  She states they are not 

actively bleeding, just painful.  She has not been on any antibiotics recently, 

no recent medication changes, no recent travel, no recent strange ingestions.





- Related Data


Allergies/Adverse Reactions: 


                                        





ondansetron HCl [From Zofran] Allergy (Intermediate, Verified 07/08/20 13:00)


   Generalized Itching, Hives








Home Medications: venlafaxine er.  meloxicam.  atorvastatin.  pantoprazole.  

metoprolol.  amlodipine.  asa.  levothyroxine





Past Medical History





- General


Information source: Patient, Relative





- Social History


Smoking Status: Never Smoker


Frequency of alcohol use: None


Drug Abuse: None


Family History: Hyperlipidemia


Patient has homicidal ideation: No





- Past Medical History


Cardiac Medical History: Reports: Hx Hypercholesterolemia, Hx Hypertension - on 

meds


   Denies: Hx Coronary Artery Disease, Hx Heart Attack


Pulmonary Medical History: 


   Denies: Hx Asthma, Hx Bronchitis, Hx COPD, Hx Pneumonia, Hx Tuberculosis


Neurological Medical History: Denies: Hx Cerebrovascular Accident, Hx Seizures


Malignancy Medical History: Reports: Hx Breast Cancer, Hx Cervical Cancer


GI Medical History: Reports: Hx Cirrhosis - No no colic.  Possibly related to 

steroid use for arthritis many years ago, Hx Gastroesophageal Reflux Disease


Musculoskeletal Medical History: Reports Hx Arthritis


Past Surgical History: Reports: Hx Abdominal Surgery - Halo surgery, Hx 

Appendectomy, Hx Breast Surgery, Hx Cholecystectomy, Hx Genitourinary Surgery - 

bladdder/vaginal, Hx Hysterectomy, Hx Tonsillectomy





- Immunizations


Hx Diphtheria, Pertussis, Tetanus Vaccination: Yes


Hx Pneumococcal Vaccination: 01/01/16





Review of Systems





- Review of Systems


Constitutional: See HPI


Gastrointestinal: See HPI


-: Yes All other systems reviewed and negative





Physical Exam





- Vital signs


Vitals: 


                                        











Temp Pulse Resp BP Pulse Ox


 


 98.4 F   59 L  18   122/72   97 


 


 07/08/20 12:50  07/08/20 12:50  07/08/20 12:50  07/08/20 12:50  07/08/20 12:50














- Notes


Notes: 





Vital signs reviewed, please refer to chart. Head is normocephalic, atraumatic. 

Pupils equal round, reactive to light.  Neck is supple without meningismus.  

Heart is regular rate and rhythm.  Lungs are clear to auscultation bilaterally. 

Abdomen is soft, nontender, normoactive bowel sounds throughout.  Rectal exam is

performed.  She does have some prolapsed and inflamed external hemorrhoids 

without any signs of thrombosis or active bleeding.  Extremities without cyanos

is, clubbing. Posterior calves are nontender.  Peripheral pulses are equal.  

Skin is warm and dry.  Patient is awake, alert, neurological exam is nonfocal.





Course





- Re-evaluation


Re-evalutation: 





07/08/20 17:32


Patient presents to the emergency department for evaluation.  She is primarily 

concerned with her hemorrhoids, but I am also concerned with why this 

79-year-old female has been having over 3 weeks of diarrhea.  COVID test is 

ordered.  Laboratory investigations including electrolytes are ordered as well. 

If she produces a stool sample, we will do stool studies, but she is low risk 

for C. difficile and other etiologies requiring antibiotic therapy.  She is 

currently stable, her hemorrhoids are inflamed but not thrombosed.  We will 

continue to monitor.


07/08/20 18:43


Patient's laboratory investigations revealed mildly elevated LFTs but otherwise 

no significant abnormality.  Her COVID test is pending.  She did not have any 

diarrhea her to offer a stool sample.  I talked at length with her about the 

possibility of a significant change in bowel habits being secondary to something

more serious, and if this continues she needs to follow-up with her primary care

provider.  She voiced understanding.  Otherwise she is told about dietary 

changes, I will send her with some Lomotil in case this persists.  She is told 

to use this as a second resort after dietary changes.  She is also told to self 

quarantine as her COVID test is pending and she voiced understanding.  Otherwise

I will treat her for her hemorrhoids.  She is to return to the emergency 

department for worsening or new concerning symptoms of any sort.





- Vital Signs


Vital signs: 


                                        











Temp Pulse Resp BP Pulse Ox


 


 98.4 F   59 L  18   122/72   97 


 


 07/08/20 13:00  07/08/20 12:50  07/08/20 12:50  07/08/20 12:50  07/08/20 12:50














- Laboratory


Result Diagrams: 


                                 07/08/20 17:00





                                 07/08/20 17:00


Laboratory results interpreted by me: 


                                        











  07/08/20 07/08/20





  17:00 17:00


 


Plt Count  144 L 


 


Chloride   110 H


 


AST   147 H


 


ALT   139 H














Discharge





- Discharge


Clinical Impression: 


 Elevated LFTs, Person under investigation for COVID-19





Diarrhea


Qualifiers:


 Diarrhea type: unspecified type Qualified Code(s): R19.7 - Diarrhea, 

unspecified





Hemorrhoids


Qualifiers:


 Hemorrhoid type: unspecified Qualified Code(s): K64.9 - Unspecified hemorrhoids





Condition: Stable


Disposition: HOME, SELF-CARE


Instructions:  Diarrhea, Nonspecific ([facility]), HC Hemorrhoid Cream ([facility]), 

Hemorrhoids ([facility])


Additional Instructions: 


Stay hydrated, increase fiber as discussed.  Try dietary changes for diarrhea.  

If diarrhea persists try the Lomotil.  Otherwise use the prescription supplied 

for your hemorrhoidal discomfort.  You can also try over-the-counter medications

like Tucks pads.  If your change in bowel habits persists, you should seek out 

further evaluation by gastroenterology.  Your liver functions are mildly 

elevated as well.  This can be a normal variant, but please discuss this with 

your primary care provider in follow-up.  Return to the emergency department 

with worsening or new concerning symptoms of any sort.


Referrals: 


ISAURO VILLAFUERTE MD [ACTIVE STAFF] - Follow up as needed

## 2020-09-14 ENCOUNTER — HOSPITAL ENCOUNTER (OUTPATIENT)
Dept: HOSPITAL 62 - WI | Age: 80
End: 2020-09-14
Attending: INTERNAL MEDICINE
Payer: MEDICARE

## 2020-09-14 DIAGNOSIS — N63.21: ICD-10-CM

## 2020-09-14 DIAGNOSIS — C50.411: Primary | ICD-10-CM

## 2020-09-14 PROCEDURE — 77062 BREAST TOMOSYNTHESIS BI: CPT

## 2020-09-14 PROCEDURE — G0279 TOMOSYNTHESIS, MAMMO: HCPCS

## 2020-09-14 PROCEDURE — 77066 DX MAMMO INCL CAD BI: CPT

## 2020-09-15 NOTE — WOMENS IMAGING REPORT
EXAM DESCRIPTION:  3D DX MAMMO BILAT



IMAGES COMPLETED DATE/TIME:  9/14/2020 8:31 am



REASON FOR STUDY:  C50.411 MALIG NEOPLM OF UPPER-OUTER QUADRANT OF RIGHT FEMALE BREAST C50.411  MALIG
 NEOPLM OF UPPER-OUTER QUADRANT OF RIGHT FEMALE



COMPARISON:  Digital tomosynthesis bilateral screening mammograms dated 9/11/2019, 9/4/2018 and 8/29/
2017.



EXAM PARAMETERS:  Standard craniocaudal and mediolateral oblique views of each breast recorded using 
digital acquisition and breast tomosynthesis.

Read with the assistance of CAD:

.[employer]  Version 9.2



LIMITATIONS:  None.



FINDINGS:  RIGHT BREAST

MASSES: No suspicious masses.

CALCIFICATIONS: No new or suspicious calcifications.

ARCHITECTURAL DISTORTION:  Postsurgical and radiation changes at the site of previous lumpectomy.

ASYMMETRY: None noted.

OTHER: No other significant findings.

LEFT BREAST

MASSES:  Stable small mass in the upper outer quadrant of the breast.

CALCIFICATIONS: No new or suspicious calcifications.

ARCHITECTURAL DISTORTION: None.

ASYMMETRY: None noted.

OTHER: No other significant finding.



IMPRESSION:  1.  Stable mammogram examination with post lumpectomy changes right breast.



BREAST DENSITY:  b. There are scattered areas of fibroglandular density.



BIRAD:  ASSESSMENT:  2 Benign findings.



RECOMMENDATION:  1.  Routine mammogram.



COMMENT:  The patient has been notified of the results by letter per MQSA requirements. Additional no
tification policies are in place for contacting patient with suspicious or incomplete findings.

Quality ID #225: The American College of Radiology recommends an annual screening mammogram for women
 aged 40 years or over. This facility utilizes a reminder system to ensure that all patients receive 
reminder letters, and/or direct phone calls for appointments. This includes reminders for routine scr
eening mammograms, diagnostic mammograms, or other Breast Imaging Interventions when appropriate.  Th
is patient will be placed in the appropriate reminder system.



TECHNICAL DOCUMENTATION:  FINDING NUMBER: (1)

ASSESSMENT: (1)

JOB ID:  [id number]

 2011 [employer]- All Rights Reserved



Reading location - IP/workstation name: [email or web address]

## 2020-11-18 ENCOUNTER — HOSPITAL ENCOUNTER (EMERGENCY)
Dept: HOSPITAL 62 - ER | Age: 80
LOS: 1 days | Discharge: HOME | End: 2020-11-19
Payer: MEDICARE

## 2020-11-18 DIAGNOSIS — R42: ICD-10-CM

## 2020-11-18 DIAGNOSIS — Z85.41: ICD-10-CM

## 2020-11-18 DIAGNOSIS — Z85.3: ICD-10-CM

## 2020-11-18 DIAGNOSIS — R53.83: Primary | ICD-10-CM

## 2020-11-18 DIAGNOSIS — Z88.8: ICD-10-CM

## 2020-11-18 DIAGNOSIS — I10: ICD-10-CM

## 2020-11-18 DIAGNOSIS — R06.02: ICD-10-CM

## 2020-11-18 DIAGNOSIS — Z20.828: ICD-10-CM

## 2020-11-18 LAB
A TYPE INFLUENZA AG: NEGATIVE
ADD MANUAL DIFF: NO
ALBUMIN SERPL-MCNC: 4.4 G/DL (ref 3.5–5)
ALP SERPL-CCNC: 126 U/L (ref 38–126)
ANION GAP SERPL CALC-SCNC: 9 MMOL/L (ref 5–19)
APPEARANCE UR: CLEAR
APTT PPP: YELLOW S
AST SERPL-CCNC: 119 U/L (ref 14–36)
B INFLUENZA AG: NEGATIVE
BASOPHILS # BLD AUTO: 0 10^3/UL (ref 0–0.2)
BASOPHILS NFR BLD AUTO: 0.6 % (ref 0–2)
BILIRUB DIRECT SERPL-MCNC: 0.4 MG/DL (ref 0–0.4)
BILIRUB SERPL-MCNC: 0.7 MG/DL (ref 0.2–1.3)
BILIRUB UR QL STRIP: NEGATIVE
BUN SERPL-MCNC: 14 MG/DL (ref 7–20)
CALCIUM: 9.7 MG/DL (ref 8.4–10.2)
CHLORIDE SERPL-SCNC: 106 MMOL/L (ref 98–107)
CO2 SERPL-SCNC: 26 MMOL/L (ref 22–30)
EOSINOPHIL # BLD AUTO: 0.1 10^3/UL (ref 0–0.6)
EOSINOPHIL NFR BLD AUTO: 2.1 % (ref 0–6)
ERYTHROCYTE [DISTWIDTH] IN BLOOD BY AUTOMATED COUNT: 13.7 % (ref 11.5–14)
GLUCOSE SERPL-MCNC: 96 MG/DL (ref 75–110)
GLUCOSE UR STRIP-MCNC: NEGATIVE MG/DL
HCT VFR BLD CALC: 40.6 % (ref 36–47)
HGB BLD-MCNC: 13.7 G/DL (ref 12–15.5)
KETONES UR STRIP-MCNC: NEGATIVE MG/DL
LYMPHOCYTES # BLD AUTO: 1.2 10^3/UL (ref 0.5–4.7)
LYMPHOCYTES NFR BLD AUTO: 19.4 % (ref 13–45)
MCH RBC QN AUTO: 30.6 PG (ref 27–33.4)
MCHC RBC AUTO-ENTMCNC: 33.6 G/DL (ref 32–36)
MCV RBC AUTO: 91 FL (ref 80–97)
MONOCYTES # BLD AUTO: 0.5 10^3/UL (ref 0.1–1.4)
MONOCYTES NFR BLD AUTO: 8.8 % (ref 3–13)
NEUTROPHILS # BLD AUTO: 4.2 10^3/UL (ref 1.7–8.2)
NEUTS SEG NFR BLD AUTO: 69.1 % (ref 42–78)
NITRITE UR QL STRIP: NEGATIVE
PH UR STRIP: 5 [PH] (ref 5–9)
PLATELET # BLD: 154 10^3/UL (ref 150–450)
POTASSIUM SERPL-SCNC: 4.1 MMOL/L (ref 3.6–5)
PROT SERPL-MCNC: 7.4 G/DL (ref 6.3–8.2)
PROT UR STRIP-MCNC: NEGATIVE MG/DL
RBC # BLD AUTO: 4.46 10^6/UL (ref 3.72–5.28)
SP GR UR STRIP: 1.01
TOTAL CELLS COUNTED % (AUTO): 100 %
UROBILINOGEN UR-MCNC: NEGATIVE MG/DL (ref ?–2)
WBC # BLD AUTO: 6.1 10^3/UL (ref 4–10.5)

## 2020-11-18 PROCEDURE — 93010 ELECTROCARDIOGRAM REPORT: CPT

## 2020-11-18 PROCEDURE — 87804 INFLUENZA ASSAY W/OPTIC: CPT

## 2020-11-18 PROCEDURE — 96360 HYDRATION IV INFUSION INIT: CPT

## 2020-11-18 PROCEDURE — 87880 STREP A ASSAY W/OPTIC: CPT

## 2020-11-18 PROCEDURE — 93005 ELECTROCARDIOGRAM TRACING: CPT

## 2020-11-18 PROCEDURE — C9803 HOPD COVID-19 SPEC COLLECT: HCPCS

## 2020-11-18 PROCEDURE — 85025 COMPLETE CBC W/AUTO DIFF WBC: CPT

## 2020-11-18 PROCEDURE — 87635 SARS-COV-2 COVID-19 AMP PRB: CPT

## 2020-11-18 PROCEDURE — 81001 URINALYSIS AUTO W/SCOPE: CPT

## 2020-11-18 PROCEDURE — 71045 X-RAY EXAM CHEST 1 VIEW: CPT

## 2020-11-18 PROCEDURE — 84484 ASSAY OF TROPONIN QUANT: CPT

## 2020-11-18 PROCEDURE — 80053 COMPREHEN METABOLIC PANEL: CPT

## 2020-11-18 PROCEDURE — 87086 URINE CULTURE/COLONY COUNT: CPT

## 2020-11-18 PROCEDURE — 87070 CULTURE OTHR SPECIMN AEROBIC: CPT

## 2020-11-18 PROCEDURE — 99285 EMERGENCY DEPT VISIT HI MDM: CPT

## 2020-11-18 PROCEDURE — 36415 COLL VENOUS BLD VENIPUNCTURE: CPT

## 2020-11-18 NOTE — RADIOLOGY REPORT (SQ)
EXAM DESCRIPTION:  CHEST SINGLE VIEW



IMAGES COMPLETED DATE/TIME:  11/18/2020 7:00 pm



REASON FOR STUDY:  Short of breath



COMPARISON:  1/27/2014



EXAM PARAMETERS:  NUMBER OF VIEWS: One view.

TECHNIQUE: Single frontal radiographic view of the chest acquired.

RADIATION DOSE: NA

LIMITATIONS: None.



FINDINGS:  LUNGS AND PLEURA: Chronic changes at the bases.

MEDIASTINUM AND HILAR STRUCTURES: No masses.  Contour normal.

HEART AND VASCULAR STRUCTURES: Heart normal in size.  Normal vasculature.

BONES: No acute findings.

HARDWARE: None in the chest.

OTHER: No other significant finding.



IMPRESSION:  Chronic basilar changes.  No acute opacities.



TECHNICAL DOCUMENTATION:  JOB ID:  [id number]

 2011 [employer]- All Rights Reserved



Reading location - IP/workstation name: [email or web address]

## 2020-11-18 NOTE — ER DOCUMENT REPORT
ED General





- General


Chief Complaint: Weakness


Stated Complaint: SHORT OF BREATH,WEAKNESS


Time Seen by Provider: 11/18/20 18:14


Mode of Arrival: Ambulatory


TRAVEL OUTSIDE OF THE [place] IN LAST 30 DAYS: No





- HPI


Notes: 





Patient is a 79-year-old female who presents with multiple complaints.  Patient 

states that she has been feeling unwell.  She states that she has had some 

increased shortness of breath.  She feels fatigued and occasionally lightheaded.

 She denies any abdominal pain or diarrhea.  No chest pain.  No urinary tract 

infection symptoms.  Patient is taking care of her  at home.  He recently

returned from visiting relatives and states that he had respiratory symptoms.  

Patient went to the hospital in [place] 2 weeks ago and had a Covid test but

is unsure of the results.  Patient mentions that she has been diagnosed with 

cirrhosis but is negative for hep C.  She is denying any abdominal pain at this 

time.  Denies any cough.  No fevers.





- Related Data


Allergies/Adverse Reactions: 


                                        





ondansetron HCl [From Zofran] Allergy (Intermediate, Verified 11/18/20 18:14)


   Generalized Itching, Hives











Past Medical History





- General


Information source: Patient





- Social History


Smoking Status: Never Smoker


Family History: Hyperlipidemia


Patient has homicidal ideation: No





- Past Medical History


Cardiac Medical History: Reports: Hx Hypercholesterolemia, Hx Hypertension - on 

meds


   Denies: Hx Coronary Artery Disease, Hx Heart Attack


Pulmonary Medical History: 


   Denies: Hx Asthma, Hx Bronchitis, Hx COPD, Hx Pneumonia, Hx Tuberculosis


Neurological Medical History: Denies: Hx Cerebrovascular Accident, Hx Seizures


Malignancy Medical History: Reports: Hx Breast Cancer, Hx Cervical Cancer


GI Medical History: Reports: Hx Cirrhosis - No no colic.  Possibly related to 

steroid use for arthritis many years ago, Hx Gastroesophageal Reflux Disease


Musculoskeletal Medical History: Reports Hx Arthritis


Past Surgical History: Reports: Hx Abdominal Surgery - Halo surgery, Hx Appendec

robbin, Hx Breast Surgery, Hx Cholecystectomy, Hx Genitourinary Surgery - 

bladdder/vaginal, Hx Hysterectomy, Hx Tonsillectomy





- Immunizations


Hx Diphtheria, Pertussis, Tetanus Vaccination: Yes


Hx Pneumococcal Vaccination: 01/01/16





Review of Systems





- Review of Systems


Notes: 





CONSTITUTIONAL:  No fever, fatigue or weight loss.  


SKIN:  No rash.  


HENT:  No congestion, ear pain, or sore throat.  


EYES:  No recent vision problems or eye pain.  


CARDIOVASCULAR:  No chest pain or edema.


RESPIRATORY:  No cough. Positive for shortness of breath.


GASTROINTESTINAL:  No abdominal pain, nausea, vomiting, bloody stools or 

diarrhea.  


GENITOURINARY: No dysuria.  


MUSCULOSKELETAL:  No joint pain or swelling.  


LYMPHATIC: No swollen glands. 


NEUROLOGIC:  No seizures. No headache, focal weakness or sensory changes. 

Positive for occasional lightheadedness. 


HEMATOLOGIC:  No unusual bruising or bleeding.  


PSYCHIATRIC:  No depression or anxiety.





Physical Exam





- Vital signs


Vitals: 


                                        











Temp Pulse Resp BP Pulse Ox


 


 98.0 F   63   18   121/56 L  95 


 


 11/18/20 16:50  11/18/20 16:50  11/18/20 16:50  11/18/20 16:50  11/18/20 16:50














- General


General appearance: Appears well


Notes: 





VITAL SIGNS: Within normal limits.


GENERAL:  No acute distress, non-toxic appearance.  


HEAD:  Normal with no signs of head trauma.


EYES:  EOMI, conjunctiva normal, no discharge.  


EARS:  Hearing grossly intact.


NOSE: Normal.


NECK:  Normal range of motion, no tenderness, supple, no lymphadenopathy, No 

adenopathy, no JVD.   


CHEST:  Clear breath sounds bilaterally.  No wheezes, rales, or rhonchi.  


CARDIAC:  Regular rate and rhythm.  S1 and S2, without murmurs, gallops, or 

rubs.


VASCULAR:  No Edema. 


ABDOMEN: Normal and soft with no tenderness, no masses or pulsatile masses.


GENITOURINARY: Normal, No tenderness


MUSCULOSKELETAL:  Good range of motion of all major joints. Extremities without 

clubbing, cyanosis or edema.  


NEUROLOGICAL:  Alert and oriented x 3.  No focal sensory or strength deficits.  

Speech normal.  Follows commands appropriately.


PSYCHIATRIC:  Normal Affect, judgement and mood.


SKIN:  Normal appearance with no rashes or lesions.





Course





- Re-evaluation


Re-evalutation: 





11/19/20 04:53


Patient appears well on exam.  She ambulated with pulse ox in the 90s.  Patient 

denies any shortness of breath.  Her work-up here is reassuring.  She has no 

signs of pneumonia.  Patient states that she was tested for COVID-19 however her

 returned home from a trip and was having symptoms of viral illness.  It 

is possible that she picked up something from him.  She was instructed to self 

isolate until she is called with a negative Covid result.  Patient is very agr

eeable to the plan.  She states she feels better after fluid bolus.  Patient was

told to follow-up with her PCP.  She was given strict return precautions.





- Vital Signs


Vital signs: 


                                        











Temp Pulse Resp BP Pulse Ox


 


 98.2 F   63   20   138/75 H  96 


 


 11/19/20 00:15  11/18/20 16:50  11/19/20 00:01  11/19/20 00:01  11/19/20 00:01














- Laboratory


Result Diagrams: 


                                 11/18/20 20:50





                                 11/18/20 20:50


Laboratory results interpreted by me: 


                                        











  11/18/20





  20:50


 


AST  119 H


 


ALT  106 H














- Diagnostic Test


Radiology reviewed: Image reviewed, Reports reviewed





- EKG Interpretation by Me


EKG shows normal: Sinus rhythm


Rate: Normal


Rhythm: NSR


When compared to previous EKG there are: Previous EKG unavailable


Additional EKG results interpreted by me: 





11/18/20 23:26


Sinus rhythm at a rate of 64.  QTc 463.  No acute ST changes.  Previous EKG 

unavailable.





Discharge





- Discharge


Clinical Impression: 


Fatigue


Qualifiers:


 Fatigue type: unspecified Qualified Code(s): R53.83 - Other fatigue





Condition: Stable


Disposition: HOME, SELF-CARE


Instructions:  COVID-19 Guidance for Persons Under Investigation


Additional Instructions: 


Your work-up today is reassuring.  Please follow-up with your family doctor.  

Please return to the ER for any worsening symptoms.  You have been tested for 

COVID-19.  You must self isolate until you are called with a negative result.

## 2020-11-18 NOTE — ER DOCUMENT REPORT
ED Medical Screen (RME)





- General


Chief Complaint: Weakness


Stated Complaint: SHORT OF BREATH,WEAKNESS


Time Seen by Provider: 11/18/20 18:14


Mode of Arrival: Ambulatory


Information source: Patient


Notes: 





79-year-old female presents to ED for complaint of being sick and not feeling 

good for 2 weeks.  She states she is short of breath at times she feels sick at 

times she feels sweaty and clammy at that time she has stomach pains at times 

she is nauseated but she always does not feel good.  She states she was tested 

for Covid at [facility] 2 weeks ago and never got any results.  We will test 

her for the Covid the flu strep blood work urine and chest x-ray.  The patient 

was evaluated during the global Covid 19 pandemic, and that diagnosis was 

suspected/considered upon their initial presentation.  Their evaluation, 

treatment and testing was consistent with current guidelines for patients who 

present with complaints or symptoms that may be related to Covid 19.














I have greeted and performed a rapid initial assessment of this patient.  A 

comprehensive ED assessment and evaluation of the patient, analysis of test 

results and completion of medical decision making process will be conducted by 

an additional ED providers.


TRAVEL OUTSIDE OF THE [place] IN LAST 30 DAYS: No





- Related Data


Allergies/Adverse Reactions: 


                                        





ondansetron HCl [From Zofran] Allergy (Intermediate, Verified 11/18/20 18:14)


   Generalized Itching, Hives











Past Medical History





- Past Medical History


Cardiac Medical History: Reports: Hx Hypercholesterolemia, Hx Hypertension - on 

meds


   Denies: Hx Coronary Artery Disease, Hx Heart Attack


Pulmonary Medical History: 


   Denies: Hx Asthma, Hx Bronchitis, Hx COPD, Hx Pneumonia, Hx Tuberculosis


Neurological Medical History: Denies: Hx Cerebrovascular Accident, Hx Seizures


Malignancy Medical History: Reports: Hx Breast Cancer, Hx Cervical Cancer


GI Medical History: Reports: Hx Cirrhosis - No no colic.  Possibly related to 

steroid use for arthritis many years ago, Hx Gastroesophageal Reflux Disease


Musculoskeltal Medical History: Reports Hx Arthritis


Past Surgical History: Reports: Hx Abdominal Surgery - Halo surgery, Hx 

Appendectomy, Hx Breast Surgery, Hx Cholecystectomy, Hx Genitourinary Surgery - 

bladdder/vaginal, Hx Hysterectomy, Hx Tonsillectomy





- Immunizations


Hx Diphtheria, Pertussis, Tetanus Vaccination: Yes





Physical Exam





- Vital signs


Vitals: 





                                        











Temp Pulse Resp BP Pulse Ox


 


 98.0 F   63   18   121/56 L  95 


 


 11/18/20 16:50  11/18/20 16:50  11/18/20 16:50  11/18/20 16:50  11/18/20 16:50














Course





- Vital Signs


Vital signs: 





                                        











Temp Pulse Resp BP Pulse Ox


 


 98.0 F   63   18   121/56 L  95 


 


 11/18/20 16:50  11/18/20 16:50  11/18/20 16:50  11/18/20 16:50  11/18/20 16:50

## 2020-11-19 VITALS — SYSTOLIC BLOOD PRESSURE: 138 MMHG | DIASTOLIC BLOOD PRESSURE: 75 MMHG
